# Patient Record
Sex: MALE | Race: WHITE | NOT HISPANIC OR LATINO | URBAN - NONMETROPOLITAN AREA
[De-identification: names, ages, dates, MRNs, and addresses within clinical notes are randomized per-mention and may not be internally consistent; named-entity substitution may affect disease eponyms.]

---

## 2021-08-16 ENCOUNTER — OFFICE VISIT (OUTPATIENT)
Dept: URBAN - NONMETROPOLITAN AREA CLINIC 2 | Facility: CLINIC | Age: 41
End: 2021-08-16
Payer: COMMERCIAL

## 2021-08-16 ENCOUNTER — TELEPHONE ENCOUNTER (OUTPATIENT)
Dept: URBAN - NONMETROPOLITAN AREA CLINIC 2 | Facility: CLINIC | Age: 41
End: 2021-08-16

## 2021-08-16 ENCOUNTER — LAB OUTSIDE AN ENCOUNTER (OUTPATIENT)
Dept: URBAN - NONMETROPOLITAN AREA CLINIC 2 | Facility: CLINIC | Age: 41
End: 2021-08-16

## 2021-08-16 ENCOUNTER — WEB ENCOUNTER (OUTPATIENT)
Dept: URBAN - NONMETROPOLITAN AREA CLINIC 2 | Facility: CLINIC | Age: 41
End: 2021-08-16

## 2021-08-16 VITALS
DIASTOLIC BLOOD PRESSURE: 86 MMHG | TEMPERATURE: 97.8 F | SYSTOLIC BLOOD PRESSURE: 148 MMHG | WEIGHT: 315 LBS | HEART RATE: 80 BPM | BODY MASS INDEX: 45.1 KG/M2 | HEIGHT: 70 IN

## 2021-08-16 DIAGNOSIS — K22.70 BARRETT'S ESOPHAGUS WITHOUT DYSPLASIA: ICD-10-CM

## 2021-08-16 DIAGNOSIS — Z12.11 COLON CANCER SCREENING: ICD-10-CM

## 2021-08-16 DIAGNOSIS — K21.9 GERD WITHOUT ESOPHAGITIS: ICD-10-CM

## 2021-08-16 PROCEDURE — 99204 OFFICE O/P NEW MOD 45 MIN: CPT | Performed by: INTERNAL MEDICINE

## 2021-08-16 RX ORDER — METFORMIN ER 500 MG 500 MG/1
TABLET ORAL
Qty: 360 | Status: ACTIVE | COMMUNITY

## 2021-08-16 RX ORDER — PANTOPRAZOLE 40 MG/1
TABLET, DELAYED RELEASE ORAL
Qty: 90 | Status: ACTIVE | COMMUNITY

## 2021-08-16 RX ORDER — GLIMEPIRIDE 2 MG/1
TABLET ORAL
Qty: 180 | Status: ACTIVE | COMMUNITY

## 2021-08-16 RX ORDER — FAMOTIDINE 20 MG/1
1 TABLET AT BEDTIME AS NEEDED TABLET, FILM COATED ORAL ONCE A DAY
Qty: 90 TABLET | Refills: 11 | OUTPATIENT
Start: 2021-08-16

## 2021-08-16 NOTE — HPI-TODAY'S VISIT:
8/16/2021 Mr. Carlos Acevedo is a 41 year old male here to establish care for a Hx of GERD, Barretts, and colon polyps. He was having a lot of GI issues in 2018. He had an EGD and colonoscopy at that time. His EGD reportedly showed Barretts. He has been taking protonix 40mg daily with good control of his reflux. He has missed 2 days and his symptoms return. His colonoscopy reportedly had 6 polyps removed. He was advised to repeat this in 5 years. He had his GB out and his GI symptoms improved. HIs bowels are normal. CS 98

## 2021-08-17 PROBLEM — 698065002 ACID REFLUX: Status: ACTIVE | Noted: 2021-08-17

## 2021-08-17 PROBLEM — 302914006 BARRETT ESOPHAGUS: Status: ACTIVE | Noted: 2021-08-17

## 2021-10-04 ENCOUNTER — OFFICE VISIT (OUTPATIENT)
Dept: URBAN - NONMETROPOLITAN AREA SURGERY CENTER 1 | Facility: SURGERY CENTER | Age: 41
End: 2021-10-04
Payer: COMMERCIAL

## 2021-10-04 ENCOUNTER — CLAIMS CREATED FROM THE CLAIM WINDOW (OUTPATIENT)
Dept: URBAN - METROPOLITAN AREA CLINIC 4 | Facility: CLINIC | Age: 41
End: 2021-10-04
Payer: COMMERCIAL

## 2021-10-04 DIAGNOSIS — K22.70 BARRETT ESOPHAGUS: ICD-10-CM

## 2021-10-04 DIAGNOSIS — K22.8 OTHER SPECIFIED DISEASES OF ESOPHAGUS: ICD-10-CM

## 2021-10-04 PROCEDURE — G8907 PT DOC NO EVENTS ON DISCHARG: HCPCS | Performed by: INTERNAL MEDICINE

## 2021-10-04 PROCEDURE — 88305 TISSUE EXAM BY PATHOLOGIST: CPT | Performed by: PATHOLOGY

## 2021-10-04 PROCEDURE — 43239 EGD BIOPSY SINGLE/MULTIPLE: CPT | Performed by: INTERNAL MEDICINE

## 2021-10-25 ENCOUNTER — OFFICE VISIT (OUTPATIENT)
Dept: URBAN - NONMETROPOLITAN AREA CLINIC 2 | Facility: CLINIC | Age: 41
End: 2021-10-25
Payer: COMMERCIAL

## 2021-10-25 ENCOUNTER — DASHBOARD ENCOUNTERS (OUTPATIENT)
Age: 41
End: 2021-10-25

## 2021-10-25 DIAGNOSIS — Z12.11 COLON CANCER SCREENING: ICD-10-CM

## 2021-10-25 DIAGNOSIS — K22.70 BARRETT'S ESOPHAGUS WITHOUT DYSPLASIA: ICD-10-CM

## 2021-10-25 DIAGNOSIS — K21.9 GERD WITHOUT ESOPHAGITIS: ICD-10-CM

## 2021-10-25 PROBLEM — 302914006: Status: ACTIVE | Noted: 2021-08-16

## 2021-10-25 PROBLEM — 266435005: Status: ACTIVE | Noted: 2021-08-16

## 2021-10-25 PROCEDURE — 99213 OFFICE O/P EST LOW 20 MIN: CPT | Performed by: NURSE PRACTITIONER

## 2021-10-25 RX ORDER — FAMOTIDINE 20 MG/1
1 TABLET AT BEDTIME AS NEEDED TABLET, FILM COATED ORAL ONCE A DAY
Qty: 90 TABLET | Refills: 11 | OUTPATIENT

## 2021-10-25 RX ORDER — FAMOTIDINE 20 MG/1
1 TABLET AT BEDTIME AS NEEDED TABLET, FILM COATED ORAL ONCE A DAY
Qty: 90 TABLET | Refills: 11 | COMMUNITY
Start: 2021-08-16

## 2021-10-25 RX ORDER — METFORMIN ER 500 MG 500 MG/1
TABLET ORAL
Qty: 360 | COMMUNITY

## 2021-10-25 RX ORDER — GLIMEPIRIDE 2 MG/1
TABLET ORAL
Qty: 180 | COMMUNITY

## 2021-10-25 RX ORDER — PANTOPRAZOLE 40 MG/1
1 TABLET TABLET, DELAYED RELEASE ORAL ONCE A DAY
Qty: 90 | Refills: 3

## 2021-10-25 RX ORDER — PANTOPRAZOLE 40 MG/1
TABLET, DELAYED RELEASE ORAL
Qty: 90 | COMMUNITY

## 2021-10-25 NOTE — HPI-TODAY'S VISIT:
10/25/2021 Mr. Carlos Acevedo is here for f/u of GERD, Barretts, and colon polyps. Hehad an EGD and colonoscopy in 2018. His EGD reportedly showed Barretts. He had a repeat EGD for surveillance that showed a 0.5cm of salmon mucosa at the GE junction. This was negative for Barrets. He is taking protonix 40mg daily with good control of his reflux. He will have some return if he misses a dose. He has not needed the pepcid. He has a question today about gastroparesis. He has diabetes. He denies any loss of appetite, nausea or vomiting.  His colonoscopy reportedly had 6 polyps removed. He was advised to repeat this in 5 years. He had his GB out and his GI symptoms improved. HIs bowels are normal. Due for repeat 2023. CS

## 2021-10-25 NOTE — HPI-OTHER HISTORIES
8/16/2021 Mr. Carlos Acevedo is a 41 year old male here to establish care for a Hx of GERD, Barretts, and colon polyps. He was having a lot of GI issues in 2018. He had an EGD and colonoscopy at that time. His EGD reportedly showed Barretts. He has been taking protonix 40mg daily with good control of his reflux. He has missed 2 days and his symptoms return. His colonoscopy reportedly had 6 polyps removed. He was advised to repeat this in 5 years. He had his GB out and his GI symptoms improved. HIs bowels are normal. CS  10/4/2021 EGD: 0.5cm at GE junction of salmon colored mucosa. Path negative for Barretts